# Patient Record
Sex: FEMALE | Race: WHITE
[De-identification: names, ages, dates, MRNs, and addresses within clinical notes are randomized per-mention and may not be internally consistent; named-entity substitution may affect disease eponyms.]

---

## 2017-01-11 ENCOUNTER — HOSPITAL ENCOUNTER (EMERGENCY)
Dept: HOSPITAL 80 - FED | Age: 77
Discharge: HOME | End: 2017-01-11
Payer: COMMERCIAL

## 2017-01-11 VITALS — RESPIRATION RATE: 16 BRPM

## 2017-01-11 VITALS — DIASTOLIC BLOOD PRESSURE: 85 MMHG | TEMPERATURE: 98.2 F | SYSTOLIC BLOOD PRESSURE: 143 MMHG | HEART RATE: 81 BPM

## 2017-01-11 VITALS — OXYGEN SATURATION: 96 %

## 2017-01-11 DIAGNOSIS — R07.9: Primary | ICD-10-CM

## 2017-01-11 LAB
% IMMATURE GRANULYOCYTES: 0.2 % (ref 0–1.1)
ABSOLUTE IMMATURE GRANULOCYTES: 0.01 10^3/UL (ref 0–0.1)
ABSOLUTE NRBC COUNT: 0 10^3/UL (ref 0–0.01)
ADD DIFF?: NO
ADD MORPH?: NO
ADD SCAN?: NO
ALBUMIN SERPL-MCNC: 4.1 G/DL (ref 3.5–5)
ALP SERPL-CCNC: 116 IU/L (ref 38–126)
ALT SERPL-CCNC: 31 IU/L (ref 9–52)
ANION GAP SERPL CALC-SCNC: 11 MEQ/L (ref 8–16)
APTT BLD: 20 SEC (ref 23–38)
AST SERPL-CCNC: 31 IU/L (ref 14–46)
ATYPICAL LYMPHOCYTE FLAG: 0 (ref 0–99)
BILIRUB SERPL-MCNC: 0.5 MG/DL (ref 0.1–1.4)
BILIRUBIN-CONJUGATED: 0.1 MG/DL (ref 0–0.5)
BILIRUBIN-UNCONJUGATED: 0.4 MG/DL (ref 0–1.1)
CALCIUM SERPL-MCNC: 11 MG/DL (ref 8.5–10.4)
CHLORIDE SERPL-SCNC: 100 MEQ/L (ref 97–110)
CO2 SERPL-SCNC: 28 MEQ/L (ref 22–31)
CREAT SERPL-MCNC: 0.8 MG/DL (ref 0.6–1)
ERYTHROCYTE [DISTWIDTH] IN BLOOD BY AUTOMATED COUNT: 13.6 % (ref 11.5–15.2)
FRAGMENT RBC FLAG: 0 (ref 0–99)
GFR SERPL CREATININE-BSD FRML MDRD: > 60 ML/MIN/{1.73_M2}
GLUCOSE SERPL-MCNC: 88 MG/DL (ref 70–100)
HCT VFR BLD CALC: 42.7 % (ref 38–47)
HGB BLD-MCNC: 14.8 G/DL (ref 12.6–16.3)
INR PPP: 0.89 (ref 0.83–1.16)
LEFT SHIFT FLG: 0 (ref 0–99)
LIPEMIA HEMOLYSIS FLAG: 90 (ref 0–99)
MCH RBC BLDCO QN: 32.2 PG (ref 27.9–34.1)
MCHC RBC AUTO-ENTMCNC: 34.7 G/DL (ref 32.4–36.7)
MCV RBC AUTO: 93 FL (ref 81.5–99.8)
NRBC-AUTO%: 0 % (ref 0–0.2)
PLATELET # BLD: 211 10^3/UL (ref 150–400)
PLATELET CLUMPS FLAG: 0 (ref 0–99)
PMV BLD AUTO: 9.7 FL (ref 8.7–11.7)
POTASSIUM SERPL-SCNC: 4.5 MEQ/L (ref 3.5–5.2)
PROT SERPL-MCNC: 6.9 G/DL (ref 6.3–8.2)
PROTHROMBIN TIME: 11.9 SEC (ref 12–15)
RBC # BLD AUTO: 4.59 10^6/UL (ref 4.18–5.33)
SODIUM SERPL-SCNC: 139 MEQ/L (ref 134–144)
TROPONIN I SERPL-MCNC: < 0.012 NG/ML (ref 0–0.03)

## 2017-01-11 NOTE — CT
CT Pulmonary Angiogram  

 

Clinical Indications:  Chest pain in a 76-year-old female with a history of recent surgery and a bord
dev abnormal D-dimer.

 

Technique:  Thinly collimated multidetector helical CT imaging was performed through the chest while 
85 mL of Isovue-370 were injected intravenously without complication.  The images were obtained at 4 
mm thickness and reconstructed at 1.5 mm thickness. Sagittal and coronal reconstructions were perform
ed. The examination is reviewed on the workstation by the interpreting physician at multiple window/l
evel settings. Dose reduction techniques were utilized.

 

Findings:

 

Chest: No focal pulmonary consolidation is identified. There is minimal dependent basilar atelectasis
. Heart size is normal.  No pericardial or pleural effusions are seen.  Hilar and mediastinal contour
s are normal. There are no masses or noncalcified pulmonary nodules.

There is a well-circumscribed low-attenuation area involving the lower pole of the left kidney which 
would correlate well with a simple cyst identified on MRI of the lumbar spine performed October 3, 20
16. Also, there is a 8 mm diameter, well-circumscribed low-attenuation region in the dome of the live
r which is statistically a simple cyst.

The upper abdomen is negative for acute abnormality on this arterial phase study.

 

CT Pulmonary Angiogram:   The pulmonary arterial system is fairly well opacified.  No intraluminal fi
lling defects are seen to suggest acute or chronic thrombopulmonary embolic disease. No vascular malf
ormations are seen.  The thoracic aorta has a normal contour without evidence of aneurysm or dissecti
on.

 

Impression:

1. No evidence of thrombopulmonary embolic disease.

2. See above report for additional findings.

 

A preliminary report was called to Dr. Vito Botello at 1900 hours in the Emergency Department.

## 2017-01-11 NOTE — DX
PA and Lateral Chest     January 11, 2017

 

History:  Chest pain.

 

Findings:  The heart and mediastinum are normal.  Pulmonary vascularity is normal.  No focal pulmonar
y consolidation is seen.  Minimal left basilar opacity is probably atelectasis.  There is no pleural 
fluid.  A pneumothorax is not identified.  Minimal scoliosis and mild spinal degenerative changes are
 seen.  Surgical clips in the right upper quadrant presumably relate to prior cholecystectomy.

 

Impression:  Chest negative for acute abnormality.

## 2017-01-11 NOTE — CPEKG
Heart Rate: 77

RR Interval: 779

P-R Interval: 156

QRSD Interval: 90

QT Interval: 368

QTC Interval: 417

P Axis: 39

QRS Axis: 18

T Wave Axis: 34

EKG Severity - ABNORMAL ECG -

EKG Impression: SINUS RHYTHM

EKG Impression: MULTIPLE ATRIAL PREMATURE COMPLEXES

Electronically Signed By: Vito Botello 11-Jan-2017 19:10:10

## 2017-01-11 NOTE — EDPHY
H & P


Stated Complaint: right sided CP since 1400 today


Time Seen by Provider: 01/11/17 16:46


HPI/ROS: 


CHIEF COMPLAINT:  Epigastric pain





HISTORY OF PRESENT ILLNESS:  The patient is a 76-year-old healthy female who 

comes to the emergency department complaining of epigastric discomfort that 

began around 2:00 p.m..  It began while she was walking at a rapid speed.  She 

states that she has had this discomfort to 3 x 4 usually at night.  She thought 

that it may be heartburn but it is different than her typical heartburn.  He 

does not have an acidic type feeling.  She thinks that maybe gas or possibly 

from an upper respiratory tract infection she has had for the last 2 weeks.  

She states that it has been improving on Mucinex.  She has not been febrile.  

She has not been coughing.  She denies shortness of breath.  She denies 

diaphoresis.  The pain does not radiate.  She classifies her pain is mild.  She 

has never had any cardiac disease.  She has a history of cholecystectomy and 

gallstone pancreatitis in 2001 but no problems since.  No nausea, vomiting or 

diarrhea.








REVIEW OF SYSTEMS:


Constitutional:  denies: chills, fever, recent illness, recent injury


EENTM: denies: blurred vision, double vision, nose congestion


Respiratory: denies: cough, shortness of breath


Cardiac:  See HPI, no irregular heart rate, lightheadedness, palpitations


Gastrointestinal/Abdominal:  See HPI


Genitourinary: denies: dysuria, frequency, hematuria, pain


Musculoskeletal: denies: joint pain, muscle pain


Skin: denies: lesions, rash, jaundice, bruising


Neurological: denies: headache, numbness, paresthesia, tingling, dizziness, 

weakness


Hematologic/Lymphatic: denies: blood clots, easy bleeding, easy bruising


Immunologic/allergic: denies: HIV/AIDS, transplant








EXAM:


GENERAL:  Well-appearing, well-nourished and in no acute distress.


HEAD:  Atraumatic, normocephalic.


EYES:  Pupils equal round and reactive to light, extraocular movements intact, 

sclera anicteric, conjunctiva are normal.


ENT:  TMs normal, nares patent, oropharynx clear without exudates.  Moist 

mucous membranes.


NECK:  Normal range of motion, supple without lymphadenopathy or JVD.


LUNGS:  Breath sounds clear to auscultation bilaterally and equal.  No wheezes 

rales or rhonchi.


HEART:  Regular rate and rhythm without murmurs, rubs or gallops.


ABDOMEN:  Soft, nontender, normoactive bowel sounds.  No guarding, no rebound.  

No masses appreciated.


BACK:  No CVA tenderness, no spinal tenderness, step-offs or deformities


EXTREMITIES:  Normal range of motion, no pitting or edema.  No clubbing or 

cyanosis.


NEUROLOGICAL:  Cranial nerves II through XII grossly intact.  Normal speech, 

normal gait.  5/5 strength, normal movement in all extremities, normal sensation


PSYCH:  Normal mood, normal affect.


SKIN:  Warm, dry, normal turgor, no visible rashes or lesions.








Source: Patient


Exam Limitations: No limitations





- Personal History


Current Tetanus/Diphtheria Vaccine: Unsure


Current Tetanus Diphtheria and Acellular Pertussis (TDAP): Unsure





- Medical/Surgical History


Hx Asthma: No


Hx Chronic Respiratory Disease: No


Hx Diabetes: No


Hx Cardiac Disease: No


Hx Renal Disease: No


Hx Cirrhosis: No


Hx Alcoholism: No


Hx HIV/AIDS: No


Hx Splenectomy or Spleen Trauma: No


Other PMH: back surgery, fractures R hip 2001, pancreatitis





- Family History


Significant Family History: Hypertension





- Social History


Smoking Status: Never smoked


Alcohol Use: Sober


Drug Use: None


Constitutional: 


 Initial Vital Signs











Temperature (C)  37.0 C   01/11/17 16:33


 


Heart Rate  85   01/11/17 16:33


 


Respiratory Rate  16   01/11/17 16:33


 


Blood Pressure  197/111 H  01/11/17 16:33


 


O2 Sat (%)  97   01/11/17 16:33








 











O2 Delivery Mode               Room Air














Allergies/Adverse Reactions: 


 





orphenadrine citrate [From Norflex] Allergy (Verified 09/05/15 10:49)


 


Penicillins Allergy (Verified 09/05/15 10:49)


 








Home Medications: 














 Medication  Instructions  Recorded


 


SIMVASTATIN  09/05/15














Medical Decision Making





- Diagnostics


EKG Interpretation: 


An EKG obtained and was read and documented in trace view.  Please see trace 

view for full reading and report.  Sinus rhythm, premature atrial complex, no 

acute ischemic changes.  


Imaging: 


Results:


CT scan of the chest angiogram was obtained.  The results of the study are 

negative for PE.  The study was read by Dr. Alvaro Palacio.  I viewed the images 

myself on the PACS system.





X-ray: chest x-ray  was obtained.  I viewed the images myself on the PACS 

system.  My interpretation of the images is:  negative for acute disease .  The 

radiologist interpretation is pending.


ED Course/Re-evaluation: 


7:10 p.m. we discussed the patient's CT and lab results which are reassuring.  

She feels much better.  She is currently symptom free.  She is eager to go 

home.  I recommended admission for rule out versus is a delta troponin.  She 

agrees to stay and have her 2nd troponin drawn at 8:00 p.m..  This should get 

does down to a myocardial infarction miss rate of only 5-6%.  She feels 

comfortable with this.  She has an appointment to follow up with her doctor 

tomorrow and will schedule an outpatient stress test with them.  I agree with 

this plan.





8:55 p.m. the patient is repeat troponin is negative. This is reassuring since 

her pain began 7 hours ago discussed strict return precautions.  Additional 

verbal discharge instructions given.


Differential Diagnosis: 


Partial list of the Differential diagnosis considered include but were not 

limited to;  acute coronary disease, PE, indigestion, gas, GERD and although 

unlikely based on the history and physical exam, I also considered peptic ulcer

, dissection, aneurysm,  pneumonia.  I discussed these differential diagnoses 

and the plan with the patient as well as the usual and expected course.  The 

patient understands that the diagnosis is provisional and that in medicine we 

are not always correct and that further workup is often warranted.  Usual and 

customary warnings were given.  All of the patient's questions were answered.  

The patient was instructed to return to the emergency department should the 

symptoms at all worsen or return, otherwise to followup with the physician as 

we discussed.





- Data Points


Laboratory Results: 


 Laboratory Results





 01/11/17 16:55 





 01/11/17 16:55 





 











  01/11/17 01/11/17





  20:00 16:55


 


WBC    4.98 10^3/uL





    (3.80-9.50) 


 


RBC    4.59 10^6/uL





    (4.18-5.33) 


 


Hgb    14.8 g/dL





    (12.6-16.3) 


 


Hct    42.7 %





    (38.0-47.0) 


 


MCV    93.0 fL





    (81.5-99.8) 


 


MCH    32.2 pg





    (27.9-34.1) 


 


MCHC    34.7 g/dL





    (32.4-36.7) 


 


RDW    13.6 %





    (11.5-15.2) 


 


Plt Count    211 10^3/uL





    (150-400) 


 


MPV    9.7 fL





    (8.7-11.7) 


 


Neut % (Auto)    60.3 %





    (39.3-74.2) 


 


Lymph % (Auto)    27.3 %





    (15.0-45.0) 


 


Mono % (Auto)    8.2 %





    (4.5-13.0) 


 


Eos % (Auto)    3.0 %





    (0.6-7.6) 


 


Baso % (Auto)    1.0 %





    (0.3-1.7) 


 


Nucleat RBC Rel Count    0.0 %





    (0.0-0.2) 


 


Absolute Neuts (auto)    3.00 10^3/uL





    (1.70-6.50) 


 


Absolute Lymphs (auto)    1.36 10^3/uL





    (1.00-3.00) 


 


Absolute Monos (auto)    0.41 10^3/uL





    (0.30-0.80) 


 


Absolute Eos (auto)    0.15 10^3/uL





    (0.03-0.40) 


 


Absolute Basos (auto)    0.05 10^3/uL





    (0.02-0.10) 


 


Absolute Nucleated RBC    0.00 10^3/uL





    (0-0.01) 


 


Immature Gran %    0.2 %





    (0.0-1.1) 


 


Immature Gran #    0.01 10^3/uL





    (0.00-0.10) 


 


PT    11.9 L SEC





    (12.0-15.0) 


 


INR    0.89 





    (0.83-1.16) 


 


APTT    20.0 L SEC





    (23.0-38.0) 


 


D-Dimer    0.58 H ug/mLFEU





    (0.00-0.50) 


 


Sodium    139 mEq/L





    (134-144) 


 


Potassium    4.5 mEq/L





    (3.5-5.2) 


 


Chloride    100 mEq/L





    () 


 


Carbon Dioxide    28 mEq/l





    (22-31) 


 


Anion Gap    11 mEq/L





    (8-16) 


 


BUN    21 mg/dL





    (7-23) 


 


Creatinine    0.8 mg/dL





    (0.6-1.0) 


 


Estimated GFR    > 60 





   


 


Glucose    88 mg/dL





    () 


 


Calcium    11.0 H mg/dL





    (8.5-10.4) 


 


Phosphorus    4.1 mg/dL





    (2.5-4.5) 


 


Total Bilirubin    0.5 mg/dL





    (0.1-1.4) 


 


Conjugated Bilirubin    0.1 mg/dL





    (0.0-0.5) 


 


Unconjugated Bilirubin    0.4 mg/dL





    (0.0-1.1) 


 


AST    31 IU/L





    (14-46) 


 


ALT    31 IU/L





    (9-52) 


 


Alkaline Phosphatase    116 IU/L





    () 


 


Troponin I  < 0.012 ng/mL  < 0.012 ng/mL





   (0-0.034)   (0-0.034) 


 


Total Protein    6.9 g/dL





    (6.3-8.2) 


 


Albumin    4.1 g/dL





    (3.5-5.0) 


 


Lipase    107.0 IU/L





    () 











Medications Given: 


 








Discontinued Medications





Aspirin (Aspirin)  324 mg PO EDNOW ONE


   Stop: 01/11/17 17:11


   Last Admin: 01/11/17 18:31 Dose:  324 mg


Sodium Chloride (Ns)  500 mls @ 0 mls/hr IV ONCE ONE


   PRN Reason: As Directed


   Stop: 01/11/17 17:11


   Last Admin: 01/11/17 18:32 Dose:  500 mls








Departure





- Departure


Disposition: Home, Routine, Self-Care


Clinical Impression: 


Chest pain


Qualifiers:


 Chest pain type: unspecified Qualifier Code: (R07.9) Chest pain, unspecified


Condition: Fair


Instructions:  Chest Pain (ED)


Referrals: 


Montserrat Antonio MD [Primary Care Provider] - As per Instructions (Follow-up 

tomorrow and schedule a stress test as discussed.)

## 2017-07-28 ENCOUNTER — HOSPITAL ENCOUNTER (OUTPATIENT)
Dept: HOSPITAL 80 - FIMAGING | Age: 77
End: 2017-07-28
Attending: PHYSICIAN ASSISTANT
Payer: COMMERCIAL

## 2017-07-28 DIAGNOSIS — M25.761: ICD-10-CM

## 2017-07-28 DIAGNOSIS — M25.762: ICD-10-CM

## 2017-07-28 DIAGNOSIS — M25.752: Primary | ICD-10-CM

## 2017-09-11 ENCOUNTER — HOSPITAL ENCOUNTER (OUTPATIENT)
Dept: HOSPITAL 80 - FIMAGING | Age: 77
End: 2017-09-11
Attending: PHYSICIAN ASSISTANT
Payer: COMMERCIAL

## 2017-09-11 DIAGNOSIS — M25.562: ICD-10-CM

## 2017-09-11 DIAGNOSIS — R93.7: Primary | ICD-10-CM

## 2017-09-14 ENCOUNTER — HOSPITAL ENCOUNTER (OUTPATIENT)
Dept: HOSPITAL 80 - FIMAGING | Age: 77
End: 2017-09-14
Attending: PHYSICIAN ASSISTANT
Payer: COMMERCIAL
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
## 2017-09-14 DIAGNOSIS — M84.352A: ICD-10-CM

## 2017-09-14 DIAGNOSIS — S83.242A: Primary | ICD-10-CM

## 2017-09-14 DIAGNOSIS — M25.462: ICD-10-CM

## 2017-09-14 DIAGNOSIS — S83.282A: ICD-10-CM

## 2017-09-14 DIAGNOSIS — M71.22: ICD-10-CM

## 2017-12-22 ENCOUNTER — HOSPITAL ENCOUNTER (OUTPATIENT)
Dept: HOSPITAL 80 - FIMAGING | Age: 77
End: 2017-12-22
Attending: INTERNAL MEDICINE
Payer: COMMERCIAL

## 2017-12-22 DIAGNOSIS — Z80.3: ICD-10-CM

## 2017-12-22 DIAGNOSIS — Z12.31: Primary | ICD-10-CM

## 2017-12-22 PROCEDURE — G0202 SCR MAMMO BI INCL CAD: HCPCS

## 2018-12-28 ENCOUNTER — HOSPITAL ENCOUNTER (OUTPATIENT)
Dept: HOSPITAL 80 - FIMAGING | Age: 78
End: 2018-12-28
Attending: INTERNAL MEDICINE
Payer: COMMERCIAL

## 2018-12-28 DIAGNOSIS — Z12.31: Primary | ICD-10-CM

## 2018-12-28 DIAGNOSIS — Z80.3: ICD-10-CM

## 2019-02-06 ENCOUNTER — HOSPITAL ENCOUNTER (OUTPATIENT)
Dept: HOSPITAL 80 - FIMAGING | Age: 79
End: 2019-02-06
Attending: INTERNAL MEDICINE
Payer: COMMERCIAL

## 2019-02-06 DIAGNOSIS — Z13.820: Primary | ICD-10-CM

## 2019-02-06 DIAGNOSIS — M81.0: ICD-10-CM

## 2019-02-06 DIAGNOSIS — K21.9: ICD-10-CM

## 2019-02-06 DIAGNOSIS — Z78.0: ICD-10-CM

## 2019-02-06 DIAGNOSIS — Z96.641: ICD-10-CM

## 2019-02-06 DIAGNOSIS — E78.00: ICD-10-CM

## 2019-02-26 ENCOUNTER — HOSPITAL ENCOUNTER (OUTPATIENT)
Dept: HOSPITAL 80 - FIMAGING | Age: 79
End: 2019-02-26
Attending: NURSE PRACTITIONER
Payer: COMMERCIAL

## 2019-02-26 DIAGNOSIS — M53.87: ICD-10-CM

## 2019-02-26 DIAGNOSIS — M51.37: ICD-10-CM

## 2019-02-26 DIAGNOSIS — M41.86: Primary | ICD-10-CM

## 2019-03-05 ENCOUNTER — HOSPITAL ENCOUNTER (OUTPATIENT)
Dept: HOSPITAL 80 - FIMAGING | Age: 79
End: 2019-03-05
Attending: PHYSICIAN ASSISTANT
Payer: COMMERCIAL

## 2019-03-05 DIAGNOSIS — M48.07: ICD-10-CM

## 2019-03-05 DIAGNOSIS — M54.18: ICD-10-CM

## 2019-03-05 DIAGNOSIS — M51.36: Primary | ICD-10-CM

## 2019-03-20 ENCOUNTER — HOSPITAL ENCOUNTER (OUTPATIENT)
Dept: HOSPITAL 80 - FIMAGING | Age: 79
End: 2019-03-20
Attending: INTERNAL MEDICINE
Payer: COMMERCIAL

## 2019-03-20 DIAGNOSIS — N28.1: Primary | ICD-10-CM
